# Patient Record
Sex: MALE | Race: WHITE
[De-identification: names, ages, dates, MRNs, and addresses within clinical notes are randomized per-mention and may not be internally consistent; named-entity substitution may affect disease eponyms.]

---

## 2017-08-16 ENCOUNTER — HOSPITAL ENCOUNTER (EMERGENCY)
Dept: HOSPITAL 42 - ED | Age: 42
Discharge: HOME | End: 2017-08-16
Payer: COMMERCIAL

## 2017-08-16 VITALS — HEART RATE: 68 BPM | SYSTOLIC BLOOD PRESSURE: 127 MMHG | RESPIRATION RATE: 18 BRPM | DIASTOLIC BLOOD PRESSURE: 79 MMHG

## 2017-08-16 VITALS — OXYGEN SATURATION: 96 % | TEMPERATURE: 98.1 F

## 2017-08-16 VITALS — BODY MASS INDEX: 25.8 KG/M2

## 2017-08-16 DIAGNOSIS — J06.9: Primary | ICD-10-CM

## 2017-08-16 DIAGNOSIS — F17.210: ICD-10-CM

## 2017-08-16 NOTE — ED PDOC
Arrival/HPI





- General


Historian: Patient





- History of Present Illness


Time/Duration: < week


Symptom Onset: Gradual


Symptom Course: Improving


Severity Level: Mild





<Chris Rice - Last Filed: 08/16/17 12:39>





<Syd Yuan - Last Filed: 08/16/17 14:08>





- General


Chief Complaint: Cough, Cold, Congestion


Time Seen by Provider: 08/16/17 11:08





- History of Present Illness


Narrative History of Present Illness (Text): 





08/16/17 11:25


42yo M with PMHx including HTN (controlled with diet) here for evaluation of 

chest congestion, cough and cold. Patient states that he has had a cough (

productive white phlegm), congestion, runny nose, subjective fevers for the 

past 5 days. State that he had to miss work for the past two days. He is 

feeling a bit better today than he has, but wanted to come in to get evaluated. 

He also reports a mild sore throat. Able to tolerate PO. Does report that his 

kids have similar symptoms. Denies any CP/SOB. No Headaches. No N/V/D. No Abd 

pain. No urinary changes. No Bowel habit changes. 





PMHx: HTN (diet controlled)


PSHx: Right ankle fx ORIF


Social Hx: Current 1 cigar per month smoker. Current occasional/social ETOH 

use. Denies any illicit drugs. Works as an  (desk job)


Allergy: Peanut (Chris Rice)





Past Medical History





- Provider Review


Nursing Documentation Reviewed: Yes





- Infectious Disease


Hx of Infectious Diseases: None





- Psychiatric


Hx Substance Use: No





- Surgical History


Other/Comment: R leg surgery with pin and plate placement around 2011





- Anesthesia


Hx Anesthesia: Yes


Hx Anesthesia Reactions: No





<Chris Rice - Last Filed: 08/16/17 12:39>





Family/Social History





- Physician Review


Nursing Documentation Reviewed: Yes


Family/Social History: No Known Family HX


Smoking Status: Current 1 cigar per month


Hx Alcohol Use: Yes


Frequency of alcohol use: Socially


Hx Substance Use: No





<Chris Rice - Last Filed: 08/16/17 12:39>





Allergies/Home Meds





<Chris Rice - Last Filed: 08/16/17 12:39>





<Syd Yuan - Last Filed: 08/16/17 14:08>


Allergies/Adverse Reactions: 


Allergies





peanut Allergy (Verified 08/16/17 10:41)


 ANAPHYLAXIS








Home Medications: 


 Home Meds











 Medication  Instructions  Recorded  Confirmed


 


No Known Home Med  08/16/17 08/16/17














Review of Systems





- Review of Systems


Constitutional: Fatigue, Fevers


Eyes: Normal


ENT: Sore Throat, Rhinorrhea.  absent: Hearing Changes


Respiratory: Cough, Sputum.  absent: SOB, Wheezing


Cardiovascular: absent: Chest Pain, Palpitations, Edema, Calf Pain, MOREL, Syncope


Gastrointestinal: absent: Abdominal Pain, Diarrhea, Nausea, Vomiting


Genitourinary Male: absent: Dysuria, Frequency


Musculoskeletal: absent: Back Pain


Neurological: absent: Headache, Dizziness





<Chris Rice - Last Filed: 08/16/17 12:39>





Physical Exam


Vital Signs Reviewed: Yes


Temperature: Afebrile


Blood Pressure: Normal


Pulse: Regular


Respiratory Rate: Normal


Appearance: Positive for: Well-Appearing, Comfortable


Pain Distress: None


Mental Status: Positive for: Alert and Oriented X 3





- Systems Exam


Head: Present: Atraumatic, Normocephalic


Extroacular Muscles: Present: EOMI


Ears: Present: Normal, NORMAL TM, Normal Canal.  No: Erythema, TM Bulging, Fluid


Mouth: Present: Moist Mucous Membranes, Normal Tounge, Normal Teeth


Pharnyx: Present: ERYTHEMA.  No: EXUDATE, TONSILS ENLARGED, Peritonsilar 

Swelling, Uvular Deviation, Muffled/Hoarse Voice, Strider (mild right sided 

erythema), Soft Palate/Uvular Edema


Nose (Internal): Present: Normal Inspection


Neck: Present: Normal Range of Motion


Respiratory/Chest: Present: Clear to Auscultation, Good Air Exchange.  No: 

Respiratory Distress, Accessory Muscle Use, Wheezes, Decreased Breath Sounds


Cardiovascular: Present: Regular Rate and Rhythm, Normal S1, S2.  No: Murmurs


Abdomen: No: Tenderness, Distention, Peritoneal Signs, Rebound, Guarding


Upper Extremity: Present: Normal Inspection.  No: Cyanosis, Edema


Lower Extremity: Present: Normal Inspection, NORMAL PULSES.  No: Edema, CALF 

TENDERNESS, Wayne's Sign


Neurological: Present: GCS=15


Skin: Present: Warm


Psychiatric: Present: Alert, Oriented x 3





<Chris Rice - Last Filed: 08/16/17 12:39>





Medical Decision Making





<Chris Rice - Last Filed: 08/16/17 12:39>





<Syd Yuan - Last Filed: 08/16/17 14:08>


ED Course and Treatment: 





08/16/17 11:37


42yo M with Pharyngitis and Upper Respiratory Illness


- CXR - No acute findings





- Discussed findings with patient. Patient to stay well hydrated. He agrees to 

follow up with his PMD. All questions and concerns addressed. Patient 

understands and agrees with plan. 


 (Chris Rice)





08/16/17 11:52





In agreement with resident note. Patient was seen and evaluated with resident, 

came up with plan and treatment together.





Patient presented to emergency department for URI symptoms. CXR negative. 

Patient's symptoms are actually much improved than a few days ago as he 

reports.  Likely resolving viral URI.  pt stable for discharge. Advised to 

present for emergency department fro new/worsening symptoms and follow up with 

PMD within few days. 


 (Syd Yuan)





- RAD Interpretation


Radiology Orders: 








08/16/17 11:24


CHEST TWO VIEWS (PA/LAT) [RAD] Stat 














- PA / NP / Resident Statement


GURU has reviewed & agrees with the documentation as recorded.


MD/ has examined the patient and agrees with the treatment plan.





<Chris Rice - Last Filed: 08/16/17 12:39>





- PA / NP / Resident Statement


GURU has reviewed & agrees with the documentation as recorded.


MD/ has examined the patient and agrees with the treatment plan.





<Syd Yuan - Last Filed: 08/16/17 14:08>





Disposition/Present on Arrival





- Present on Arrival


Any Indicators Present on Arrival: No


History of DVT/PE: No


History of Uncontrolled Diabetes: No


Urinary Catheter: No


History of Decub. Ulcer: No


History Surgical Site Infection Following: None





- Disposition


Have Diagnosis and Disposition been Completed?: Yes


Disposition Time: 11:40


Patient Plan: Discharge





<Chris Rice - Last Filed: 08/16/17 12:39>





<Syd Yuan - Last Filed: 08/16/17 14:08>





- Disposition


Diagnosis: 


 Upper respiratory infection, Viral illness





Disposition: HOME/ ROUTINE


Condition: GOOD


Discharge Instructions (ExitCare):  Upper Respiratory Infection (ED), Viral 

Syndrome (ED)


Additional Instructions: 


1. Follow up with your Primary Care Physician within three days. 


2. Rest and stay well hydrated. 


3. Return to the ER with any concerning symptoms


Referrals: 


PCP,NO [Primary Care Provider] - Follow up with primary


Forms:  [facility] (English), WORK NOTE

## 2017-08-16 NOTE — RAD
HISTORY:

congestion  



COMPARISON:

No prior.



TECHNIQUE:

Chest PA and lateral



FINDINGS:



LUNGS:

No active pulmonary disease.



PLEURA:

No significant pleural effusion identified. No pneumothorax apparent.



CARDIOVASCULAR:

Normal.



OSSEOUS STRUCTURES:

No significant abnormalities.



VISUALIZED UPPER ABDOMEN:

Normal.



OTHER FINDINGS:

None.



IMPRESSION:

No active disease.

## 2018-04-28 ENCOUNTER — HOSPITAL ENCOUNTER (EMERGENCY)
Dept: HOSPITAL 42 - ED | Age: 43
Discharge: LEFT BEFORE DISCHARGE | End: 2018-04-28
Payer: SELF-PAY

## 2018-04-28 VITALS
OXYGEN SATURATION: 96 % | HEART RATE: 108 BPM | SYSTOLIC BLOOD PRESSURE: 155 MMHG | DIASTOLIC BLOOD PRESSURE: 90 MMHG | TEMPERATURE: 100.3 F | RESPIRATION RATE: 16 BRPM

## 2018-04-28 VITALS — BODY MASS INDEX: 27.3 KG/M2

## 2018-04-28 DIAGNOSIS — J11.1: ICD-10-CM

## 2018-04-28 DIAGNOSIS — Z02.89: Primary | ICD-10-CM
